# Patient Record
Sex: MALE | Race: WHITE | NOT HISPANIC OR LATINO
[De-identification: names, ages, dates, MRNs, and addresses within clinical notes are randomized per-mention and may not be internally consistent; named-entity substitution may affect disease eponyms.]

---

## 2017-01-27 ENCOUNTER — APPOINTMENT (OUTPATIENT)
Dept: SURGERY | Facility: CLINIC | Age: 37
End: 2017-01-27

## 2017-01-28 RX ORDER — ALPRAZOLAM 0.25 MG/1
0.25 TABLET ORAL
Qty: 30 | Refills: 0 | Status: ACTIVE | COMMUNITY
Start: 2016-12-13

## 2017-06-11 ENCOUNTER — EMERGENCY (EMERGENCY)
Facility: HOSPITAL | Age: 37
LOS: 1 days | Discharge: HOME | End: 2017-06-11
Admitting: EMERGENCY MEDICINE
Payer: MEDICAID

## 2017-06-11 PROCEDURE — 99284 EMERGENCY DEPT VISIT MOD MDM: CPT

## 2017-06-12 PROCEDURE — 96374 THER/PROPH/DIAG INJ IV PUSH: CPT

## 2017-06-12 PROCEDURE — 96361 HYDRATE IV INFUSION ADD-ON: CPT

## 2017-06-12 PROCEDURE — 99284 EMERGENCY DEPT VISIT MOD MDM: CPT | Mod: 25

## 2017-06-12 PROCEDURE — 80053 COMPREHEN METABOLIC PANEL: CPT

## 2017-06-12 PROCEDURE — 83690 ASSAY OF LIPASE: CPT

## 2017-06-12 PROCEDURE — 83735 ASSAY OF MAGNESIUM: CPT

## 2017-06-12 PROCEDURE — 85027 COMPLETE CBC AUTOMATED: CPT

## 2017-08-02 ENCOUNTER — APPOINTMENT (OUTPATIENT)
Dept: SURGERY | Facility: CLINIC | Age: 37
End: 2017-08-02
Payer: COMMERCIAL

## 2017-08-02 DIAGNOSIS — K40.90 UNILATERAL INGUINAL HERNIA, W/OUT OBSTRUCTION OR GANGRENE, NOT SPECIFIED AS RECURRENT: ICD-10-CM

## 2017-08-02 DIAGNOSIS — Z82.49 FAMILY HISTORY OF ISCHEMIC HEART DISEASE AND OTHER DISEASES OF THE CIRCULATORY SYSTEM: ICD-10-CM

## 2017-08-02 DIAGNOSIS — R10.31 RIGHT LOWER QUADRANT PAIN: ICD-10-CM

## 2017-08-02 PROCEDURE — 99213 OFFICE O/P EST LOW 20 MIN: CPT

## 2017-08-03 PROBLEM — R10.31 GROIN PAIN, RIGHT: Status: ACTIVE | Noted: 2017-01-28

## 2017-08-24 ENCOUNTER — EMERGENCY (EMERGENCY)
Facility: HOSPITAL | Age: 37
LOS: 1 days | Discharge: HOME | End: 2017-08-24
Admitting: EMERGENCY MEDICINE
Payer: MEDICAID

## 2017-08-24 PROCEDURE — 93010 ELECTROCARDIOGRAM REPORT: CPT

## 2017-08-24 PROCEDURE — 93005 ELECTROCARDIOGRAM TRACING: CPT

## 2017-08-24 PROCEDURE — 99283 EMERGENCY DEPT VISIT LOW MDM: CPT | Mod: 25

## 2017-08-24 PROCEDURE — 99284 EMERGENCY DEPT VISIT MOD MDM: CPT

## 2018-03-28 ENCOUNTER — OUTPATIENT (OUTPATIENT)
Dept: OUTPATIENT SERVICES | Facility: HOSPITAL | Age: 38
LOS: 1 days | End: 2018-03-28
Payer: MEDICAID

## 2018-03-28 ENCOUNTER — APPOINTMENT (OUTPATIENT)
Dept: CT IMAGING | Facility: HOSPITAL | Age: 38
End: 2018-03-28
Payer: COMMERCIAL

## 2018-03-28 DIAGNOSIS — Z00.8 ENCOUNTER FOR OTHER GENERAL EXAMINATION: ICD-10-CM

## 2018-03-28 PROCEDURE — 72193 CT PELVIS W/DYE: CPT

## 2018-03-28 PROCEDURE — 72193 CT PELVIS W/DYE: CPT | Mod: 26

## 2019-03-14 ENCOUNTER — TRANSCRIPTION ENCOUNTER (OUTPATIENT)
Age: 39
End: 2019-03-14

## 2019-12-20 ENCOUNTER — TRANSCRIPTION ENCOUNTER (OUTPATIENT)
Age: 39
End: 2019-12-20

## 2020-03-06 ENCOUNTER — TRANSCRIPTION ENCOUNTER (OUTPATIENT)
Age: 40
End: 2020-03-06

## 2021-11-08 ENCOUNTER — RESULT REVIEW (OUTPATIENT)
Age: 41
End: 2021-11-08

## 2022-07-27 ENCOUNTER — OUTPATIENT (OUTPATIENT)
Dept: OUTPATIENT SERVICES | Facility: HOSPITAL | Age: 42
LOS: 1 days | End: 2022-07-27
Payer: MEDICARE

## 2022-07-27 ENCOUNTER — APPOINTMENT (OUTPATIENT)
Dept: ULTRASOUND IMAGING | Facility: HOSPITAL | Age: 42
End: 2022-07-27

## 2022-07-27 DIAGNOSIS — Z00.8 ENCOUNTER FOR OTHER GENERAL EXAMINATION: ICD-10-CM

## 2022-07-27 PROCEDURE — 93975 VASCULAR STUDY: CPT

## 2022-07-27 PROCEDURE — 93975 VASCULAR STUDY: CPT | Mod: 26

## 2022-08-26 ENCOUNTER — APPOINTMENT (OUTPATIENT)
Dept: PEDIATRIC ALLERGY IMMUNOLOGY | Facility: CLINIC | Age: 42
End: 2022-08-26

## 2022-08-26 DIAGNOSIS — Z23 ENCOUNTER FOR IMMUNIZATION: ICD-10-CM

## 2022-08-26 PROCEDURE — 90611 SMALLPOX&MONKEYPOX VAC 0.5ML: CPT | Mod: NC

## 2022-08-26 PROCEDURE — 90471 IMMUNIZATION ADMIN: CPT

## 2022-09-23 ENCOUNTER — APPOINTMENT (OUTPATIENT)
Dept: PEDIATRIC ALLERGY IMMUNOLOGY | Facility: CLINIC | Age: 42
End: 2022-09-23

## 2023-10-28 ENCOUNTER — EMERGENCY (EMERGENCY)
Facility: HOSPITAL | Age: 43
LOS: 1 days | Discharge: HOME | End: 2023-10-28
Attending: STUDENT IN AN ORGANIZED HEALTH CARE EDUCATION/TRAINING PROGRAM | Admitting: STUDENT IN AN ORGANIZED HEALTH CARE EDUCATION/TRAINING PROGRAM
Payer: MEDICARE

## 2023-10-28 ENCOUNTER — TRANSCRIPTION ENCOUNTER (OUTPATIENT)
Age: 43
End: 2023-10-28

## 2023-10-28 VITALS
WEIGHT: 169.98 LBS | OXYGEN SATURATION: 98 % | HEIGHT: 71 IN | DIASTOLIC BLOOD PRESSURE: 87 MMHG | SYSTOLIC BLOOD PRESSURE: 123 MMHG | HEART RATE: 112 BPM | TEMPERATURE: 99 F

## 2023-10-28 VITALS
RESPIRATION RATE: 17 BRPM | HEART RATE: 87 BPM | SYSTOLIC BLOOD PRESSURE: 120 MMHG | DIASTOLIC BLOOD PRESSURE: 84 MMHG | OXYGEN SATURATION: 97 %

## 2023-10-28 LAB
ALBUMIN SERPL ELPH-MCNC: 3.5 G/DL — SIGNIFICANT CHANGE UP (ref 3.3–5)
ALBUMIN SERPL ELPH-MCNC: 3.5 G/DL — SIGNIFICANT CHANGE UP (ref 3.3–5)
ALP SERPL-CCNC: 67 U/L — SIGNIFICANT CHANGE UP (ref 40–120)
ALP SERPL-CCNC: 67 U/L — SIGNIFICANT CHANGE UP (ref 40–120)
ALT FLD-CCNC: 41 U/L — SIGNIFICANT CHANGE UP (ref 10–45)
ALT FLD-CCNC: 41 U/L — SIGNIFICANT CHANGE UP (ref 10–45)
ANION GAP SERPL CALC-SCNC: 6 MMOL/L — SIGNIFICANT CHANGE UP (ref 5–17)
ANION GAP SERPL CALC-SCNC: 6 MMOL/L — SIGNIFICANT CHANGE UP (ref 5–17)
AST SERPL-CCNC: 43 U/L — HIGH (ref 10–40)
AST SERPL-CCNC: 43 U/L — HIGH (ref 10–40)
BILIRUB SERPL-MCNC: 2.4 MG/DL — HIGH (ref 0.2–1.2)
BILIRUB SERPL-MCNC: 2.4 MG/DL — HIGH (ref 0.2–1.2)
BUN SERPL-MCNC: 8 MG/DL — SIGNIFICANT CHANGE UP (ref 7–23)
BUN SERPL-MCNC: 8 MG/DL — SIGNIFICANT CHANGE UP (ref 7–23)
CALCIUM SERPL-MCNC: 9.4 MG/DL — SIGNIFICANT CHANGE UP (ref 8.4–10.5)
CALCIUM SERPL-MCNC: 9.4 MG/DL — SIGNIFICANT CHANGE UP (ref 8.4–10.5)
CHLORIDE SERPL-SCNC: 99 MMOL/L — SIGNIFICANT CHANGE UP (ref 96–108)
CHLORIDE SERPL-SCNC: 99 MMOL/L — SIGNIFICANT CHANGE UP (ref 96–108)
CO2 SERPL-SCNC: 31 MMOL/L — SIGNIFICANT CHANGE UP (ref 22–31)
CO2 SERPL-SCNC: 31 MMOL/L — SIGNIFICANT CHANGE UP (ref 22–31)
CREAT SERPL-MCNC: 0.91 MG/DL — SIGNIFICANT CHANGE UP (ref 0.5–1.3)
CREAT SERPL-MCNC: 0.91 MG/DL — SIGNIFICANT CHANGE UP (ref 0.5–1.3)
EGFR: 108 ML/MIN/1.73M2 — SIGNIFICANT CHANGE UP
EGFR: 108 ML/MIN/1.73M2 — SIGNIFICANT CHANGE UP
GLUCOSE SERPL-MCNC: 106 MG/DL — HIGH (ref 70–99)
GLUCOSE SERPL-MCNC: 106 MG/DL — HIGH (ref 70–99)
HCT VFR BLD CALC: 49.3 % — SIGNIFICANT CHANGE UP (ref 39–50)
HCT VFR BLD CALC: 49.3 % — SIGNIFICANT CHANGE UP (ref 39–50)
HGB BLD-MCNC: 16.2 G/DL — SIGNIFICANT CHANGE UP (ref 13–17)
HGB BLD-MCNC: 16.2 G/DL — SIGNIFICANT CHANGE UP (ref 13–17)
LIDOCAIN IGE QN: 20 U/L — SIGNIFICANT CHANGE UP (ref 16–77)
LIDOCAIN IGE QN: 20 U/L — SIGNIFICANT CHANGE UP (ref 16–77)
MAGNESIUM SERPL-MCNC: 1.6 MG/DL — SIGNIFICANT CHANGE UP (ref 1.6–2.6)
MAGNESIUM SERPL-MCNC: 1.6 MG/DL — SIGNIFICANT CHANGE UP (ref 1.6–2.6)
MCHC RBC-ENTMCNC: 29 PG — SIGNIFICANT CHANGE UP (ref 27–34)
MCHC RBC-ENTMCNC: 29 PG — SIGNIFICANT CHANGE UP (ref 27–34)
MCHC RBC-ENTMCNC: 32.9 GM/DL — SIGNIFICANT CHANGE UP (ref 32–36)
MCHC RBC-ENTMCNC: 32.9 GM/DL — SIGNIFICANT CHANGE UP (ref 32–36)
MCV RBC AUTO: 88.2 FL — SIGNIFICANT CHANGE UP (ref 80–100)
MCV RBC AUTO: 88.2 FL — SIGNIFICANT CHANGE UP (ref 80–100)
NRBC # BLD: 0 /100 WBCS — SIGNIFICANT CHANGE UP (ref 0–0)
NRBC # BLD: 0 /100 WBCS — SIGNIFICANT CHANGE UP (ref 0–0)
PLATELET # BLD AUTO: 169 K/UL — SIGNIFICANT CHANGE UP (ref 150–400)
PLATELET # BLD AUTO: 169 K/UL — SIGNIFICANT CHANGE UP (ref 150–400)
POTASSIUM SERPL-MCNC: 4.9 MMOL/L — SIGNIFICANT CHANGE UP (ref 3.5–5.3)
POTASSIUM SERPL-MCNC: 4.9 MMOL/L — SIGNIFICANT CHANGE UP (ref 3.5–5.3)
POTASSIUM SERPL-SCNC: 4.9 MMOL/L — SIGNIFICANT CHANGE UP (ref 3.5–5.3)
POTASSIUM SERPL-SCNC: 4.9 MMOL/L — SIGNIFICANT CHANGE UP (ref 3.5–5.3)
PROT SERPL-MCNC: 7.3 G/DL — SIGNIFICANT CHANGE UP (ref 6–8.3)
PROT SERPL-MCNC: 7.3 G/DL — SIGNIFICANT CHANGE UP (ref 6–8.3)
RBC # BLD: 5.59 M/UL — SIGNIFICANT CHANGE UP (ref 4.2–5.8)
RBC # BLD: 5.59 M/UL — SIGNIFICANT CHANGE UP (ref 4.2–5.8)
RBC # FLD: 12.7 % — SIGNIFICANT CHANGE UP (ref 10.3–14.5)
RBC # FLD: 12.7 % — SIGNIFICANT CHANGE UP (ref 10.3–14.5)
SODIUM SERPL-SCNC: 136 MMOL/L — SIGNIFICANT CHANGE UP (ref 135–145)
SODIUM SERPL-SCNC: 136 MMOL/L — SIGNIFICANT CHANGE UP (ref 135–145)
WBC # BLD: 8 K/UL — SIGNIFICANT CHANGE UP (ref 3.8–10.5)
WBC # BLD: 8 K/UL — SIGNIFICANT CHANGE UP (ref 3.8–10.5)
WBC # FLD AUTO: 8 K/UL — SIGNIFICANT CHANGE UP (ref 3.8–10.5)
WBC # FLD AUTO: 8 K/UL — SIGNIFICANT CHANGE UP (ref 3.8–10.5)

## 2023-10-28 PROCEDURE — 36415 COLL VENOUS BLD VENIPUNCTURE: CPT

## 2023-10-28 PROCEDURE — 96375 TX/PRO/DX INJ NEW DRUG ADDON: CPT

## 2023-10-28 PROCEDURE — 74177 CT ABD & PELVIS W/CONTRAST: CPT | Mod: MA

## 2023-10-28 PROCEDURE — 83735 ASSAY OF MAGNESIUM: CPT

## 2023-10-28 PROCEDURE — 87077 CULTURE AEROBIC IDENTIFY: CPT

## 2023-10-28 PROCEDURE — 80053 COMPREHEN METABOLIC PANEL: CPT

## 2023-10-28 PROCEDURE — 87045 FECES CULTURE AEROBIC BACT: CPT

## 2023-10-28 PROCEDURE — 87046 STOOL CULTR AEROBIC BACT EA: CPT

## 2023-10-28 PROCEDURE — 74177 CT ABD & PELVIS W/CONTRAST: CPT | Mod: 26,MA

## 2023-10-28 PROCEDURE — 83690 ASSAY OF LIPASE: CPT

## 2023-10-28 PROCEDURE — 85027 COMPLETE CBC AUTOMATED: CPT

## 2023-10-28 PROCEDURE — 99284 EMERGENCY DEPT VISIT MOD MDM: CPT | Mod: 25

## 2023-10-28 PROCEDURE — 99285 EMERGENCY DEPT VISIT HI MDM: CPT

## 2023-10-28 PROCEDURE — 96374 THER/PROPH/DIAG INJ IV PUSH: CPT | Mod: XU

## 2023-10-28 RX ORDER — ONDANSETRON 8 MG/1
4 TABLET, FILM COATED ORAL ONCE
Refills: 0 | Status: COMPLETED | OUTPATIENT
Start: 2023-10-28 | End: 2023-10-28

## 2023-10-28 RX ORDER — SODIUM CHLORIDE 9 MG/ML
1000 INJECTION INTRAMUSCULAR; INTRAVENOUS; SUBCUTANEOUS ONCE
Refills: 0 | Status: COMPLETED | OUTPATIENT
Start: 2023-10-28 | End: 2023-10-28

## 2023-10-28 RX ORDER — ONDANSETRON 8 MG/1
1 TABLET, FILM COATED ORAL
Qty: 21 | Refills: 0
Start: 2023-10-28 | End: 2023-11-03

## 2023-10-28 RX ORDER — KETOROLAC TROMETHAMINE 30 MG/ML
15 SYRINGE (ML) INJECTION ONCE
Refills: 0 | Status: DISCONTINUED | OUTPATIENT
Start: 2023-10-28 | End: 2023-10-28

## 2023-10-28 RX ADMIN — Medication 15 MILLIGRAM(S): at 09:06

## 2023-10-28 RX ADMIN — SODIUM CHLORIDE 1000 MILLILITER(S): 9 INJECTION INTRAMUSCULAR; INTRAVENOUS; SUBCUTANEOUS at 11:44

## 2023-10-28 RX ADMIN — SODIUM CHLORIDE 1000 MILLILITER(S): 9 INJECTION INTRAMUSCULAR; INTRAVENOUS; SUBCUTANEOUS at 09:06

## 2023-10-28 RX ADMIN — Medication 15 MILLIGRAM(S): at 09:36

## 2023-10-28 RX ADMIN — Medication 1 TABLET(S): at 11:45

## 2023-10-28 RX ADMIN — ONDANSETRON 4 MILLIGRAM(S): 8 TABLET, FILM COATED ORAL at 09:12

## 2023-10-28 NOTE — ED ADULT NURSE NOTE - OBJECTIVE STATEMENT
Pt presents to ED with c/o diarrhea.  Reports diarrhea for several days, accompanied by intermittent LLQ pain described as cramping sensation.  Reports fevers, chills, nausea.  Denies any urinary complaints.  Now presents to ED with c/o blood in loose stool starting yesterday.

## 2023-10-28 NOTE — ED PROVIDER NOTE - NSFOLLOWUPINSTRUCTIONS_ED_ALL_ED_FT
COLITIS  Colitis is inflammation of the colon. Colitis may last a short time (be acute), or it may last a long time (become chronic).    What are the causes?  This condition may be caused by:  •Viruses.  •Bacteria.  •Reaction to medicine.  •Certain autoimmune diseases such as Crohn's disease or ulcerative colitis.    What are the signs or symptoms?  Symptoms of this condition include:  •Watery diarrhea.  •Passing bloody or tarry stool.  •Pain.  •Fever.  •Vomiting.  •Tiredness (fatigue).  •Weight loss.  •Bloating.  •Abdominal pain.  •Having fewer bowel movements than usual.  •A strong and sudden urge to have a bowel movement.  •Feeling like the bowel is not empty after a bowel movement.    HOW IS THIS CONDITION DIAGNOSED AND TREATED?  This condition is diagnosed with a stool test or a blood test.  You may also have other tests, such as:  •CT scan.  •Colonoscopy.  •Endoscopy.  •Biopsy.    How is this treated?  Treatment for this condition depends on the cause. The condition may be treated by:  •Resting the bowel. This involves not eating or drinking for a period of time.  •Fluids that are given through an IV.  •Medicine for pain and diarrhea.  •Antibiotic medicines.  •Cortisone medicines.  •Surgery.    FOLLOW THESE INSTRUCTIONS AT HOME  Eating and drinking   •Follow instructions from your health care provider about eating or drinking restrictions.  •Drink enough fluid to keep your urine pale yellow.  •Work with a dietitian to determine which foods cause your condition to flare up.  •Avoid foods that cause flare-ups.  •Eat a well-balanced diet.    General instructions   •If you were prescribed an antibiotic medicine, take it as told by your health care provider. Do not stop taking the antibiotic even if you start to feel better.  •Take over-the-counter and prescription medicines only as told by your health care provider.  •Keep all follow-up visits as told by your health care provider. This is important.    Contact a health care provider if:  •Your symptoms do not go away.  •You develop new symptoms.    Get help right away if you:  •Have a fever that does not go away with treatment.  •Develop chills  •Have extreme weakness, fainting, or dehydration.  •Have repeated vomiting.  •Develop severe pain in your abdomen.  •Pass bloody or tarry stool.    Summary  •Colitis is inflammation of the colon. Colitis may last a short time (be acute), or it may last a long time (become chronic).  •Treatment for this condition depends on the cause and may include resting the bowel, taking medicines, or having surgery.  •If you were prescribed an antibiotic medicine, take it as told by your health care provider. Do not stop taking the antibiotic even if you start to feel better.  •Get help right away if you develop severe pain in your abdomen.  •Keep all follow-up visits as told by your health care provider. This is important.    Advance activity as tolerated.  Continue all previously prescribed medications as directed unless otherwise instructed.  Follow up with your primary care physician in 48-72 hours- bring copies of your results.  Return to the ER for worsening or persistent symptoms, and/or ANY NEW OR CONCERNING SYMPTOMS. If you have issues obtaining follow up, please call: [phone] to obtain a doctor or specialist who takes your insurance in your area.  You may call [phone] to make an appointment with the internal medicine clinic.

## 2023-10-28 NOTE — ED PROVIDER NOTE - PATIENT PORTAL LINK FT
You can access the FollowMyHealth Patient Portal offered by [facility] by registering at the following website: [email or web address]. By joining [facility]’s FollowMyHealth portal, you will also be able to view your health information using other applications (apps) compatible with our system.

## 2023-10-28 NOTE — ED ADULT TRIAGE NOTE - CHIEF COMPLAINT QUOTE
C/o diarrhea, fever/chills, body aches since Thursday. States he is now having blood in stool. Covid/flu negative yesterday at PCP office yesterday.

## 2023-10-28 NOTE — ED PROVIDER NOTE - PHYSICAL EXAMINATION
VITAL SIGNS: I have reviewed nursing notes and confirm.   GEN: Well-developed; well-nourished; in no acute distress. Speaking full sentences.  SKIN: Warm, pink, no rash, no diaphoresis, no cyanosis, well perfused.   HEAD: Normocephalic; atraumatic. No scalp lacerations, no abrasions.  NECK: Supple; non tender.   EYES: Pupils 3mm equal, round, reactive to light and accomodation, conjunctiva and sclera clear. Extra-ocular movements intact bilaterally.  ENT: No nasal discharge; airway clear. Trachea is midline. Normal dentition.  CV: RRR. S1, S2 normal; no murmurs, gallops, or rubs. Capillary refill < 2 seconds throughout. Distal pulses intact 2+ throughout.  RESP: CTA bilaterally. No wheezes, rales, or rhonchi.   ABD: Normal bowel sounds, soft, non-distended, (+) mild diffuse ttp, no rebound, no guarding, no rigidity, no hepatosplenomegaly.  MSK: Normal range of motion and movement of all 4 extremities. No apparent joint or muscular pain throughout.    BACK: No thoracolumbar midline or paravertebral tenderness. No step-offs or obvious deformities.  NEURO: Alert & oriented x 3, Grossly unremarkable. Sensory and motor intact throughout. No focal deficits. Gait: Fluid. Normal speech and coordination.

## 2023-10-28 NOTE — ED PROVIDER NOTE - INTERNATIONAL TRAVEL
Problem: At Risk for Falls  Goal: # Patient does not fall  Outcome: Outcome Met, Continue evaluating goal progress toward completion  Goal: # Takes action to control fall-related risks  Outcome: Outcome Met, Continue evaluating goal progress toward completion  Goal: # Verbalizes understanding of fall risk/precautions  Description: Document education using the patient education activity  Outcome: Outcome Met, Continue evaluating goal progress toward completion     Problem: At Risk for Injury Due to Fall  Goal: # Patient does not fall  Outcome: Outcome Met, Continue evaluating goal progress toward completion     Problem: Artificial Airway Management  Goal: # Maintains effective artificial airway  Outcome: Outcome Met, Continue evaluating goal progress toward completion  Goal: # Maintains skin integrity around the airway  Outcome: Outcome Met, Continue evaluating goal progress toward completion     Problem: Pressure Injury, Risk for  Goal: # Skin remains intact  Outcome: Outcome Met, Continue evaluating goal progress toward completion  Goal: No new pressure injury (PI) development  Outcome: Outcome Met, Continue evaluating goal progress toward completion  Goal: # Verbalizes understanding of PI risk factors and prevention strategies  Description: Document education using the patient education activity.   Outcome: Outcome Met, Continue evaluating goal progress toward completion  Goal: Comfort optimized with pressure injury prevention strategies guided by patient/family preference. (Hospice)  Outcome: Outcome Met, Continue evaluating goal progress toward completion     Problem: Activity Intolerance  Goal: # Functional status is maintained or returned to baseline  Outcome: Outcome Met, Continue evaluating goal progress toward completion  Goal: # Tolerates activity for d/c setting with no clinical problems  Outcome: Outcome Met, Continue evaluating goal progress toward completion     Problem: Delirium, Risk for  Goal: #  No symptoms of delirium  Description: Evaluate delirium symptoms under active problem when present  Outcome: Outcome Met, Continue evaluating goal progress toward completion  Goal: # Verbalizes understanding of delirium preventive strategies  Description: Document on Patient Education Activity   Outcome: Outcome Met, Continue evaluating goal progress toward completion     Problem: Impaired Physical Mobility  Goal: # Bed mobility, ambulation, and ADLs are maintained or returned to baseline during hospitalization  Outcome: Outcome Met, Continue evaluating goal progress toward completion     Problem: VTE, Risk for  Goal: # No s/s of VTE  Outcome: Outcome Met, Continue evaluating goal progress toward completion  Goal: # Verbalizes understanding of VTE risk factors and prevention  Description: Document education using the patient education activity.   Outcome: Outcome Met, Continue evaluating goal progress toward completion  Goal: Demonstrates ability to administer injectable anticoagulants if ordered for d/c  Description: Document education using the patient education activity.  Outcome: Outcome Met, Continue evaluating goal progress toward completion     Problem: Potential for injury, Restraints  Goal: # Remains free from injury  Outcome: Outcome Met, Continue evaluating goal progress toward completion  Goal: Verbalizes criteria for restraint discontinuation  Description: Document on Patient Education Activity  Outcome: Outcome Met, Continue evaluating goal progress toward completion      No

## 2023-10-28 NOTE — ED ADULT NURSE NOTE - NSFALLUNIVINTERV_ED_ALL_ED
Bed/Stretcher in lowest position, wheels locked, appropriate side rails in place/Call bell, personal items and telephone in reach/Instruct patient to call for assistance before getting out of bed/chair/stretcher/Non-slip footwear applied when patient is off stretcher/[place] to call system/Physically safe environment - no spills, clutter or unnecessary equipment/Purposeful proactive rounding/Room/bathroom lighting operational, light cord in reach

## 2023-10-28 NOTE — ED PROVIDER NOTE - OBJECTIVE STATEMENT
43m no pmhx presenting with diarrhea x 1 day. Describes onset of wattery diarrhea this morning, multiple times a/w fever. Mild abdominal diffuse cramping pain, nonradiating. Denies any chest pain,  shortness of breath, nausea/vomiting, headaches,   weakness, syncope, hematuria, dysuria, urinary symptoms, subjective neurological deficits, surgical hx, trauma, falls, rashes, sick contacts, recent abx use, recent hospitalizations, recent travel hx.

## 2023-10-28 NOTE — ED PROVIDER NOTE - CLINICAL SUMMARY MEDICAL DECISION MAKING FREE TEXT BOX
This patient w/ nausea/vomiting and diarrhea is likely secondary to benign infectious cause vs gastroenteritis. Considered the differential diagnosis but LOW risk for SBO (normal bowel movements, passing flatus, XXX no abdominal surgeries), no signs of DKA on labs. Patient's BMP with normal electrolytes and no signs of dehydration causing prerenal CHELSEY. Low suspicion for gastric or esophageal dysmotility as a cause. XXX Patient w/ no chest pain, unremarkable EKG, thus LOW suspicion for ACS. Based on the history, exam, and work up, there is a low suspicion for pancreatitis, appendicitis, biliary pathology, C. Diff or other emergent problems.  PLAN:  - IVF, CBC/CMP, Lipase, CT colitis  - Pain control PRN  - augmentin This patient w/ nausea/vomiting and diarrhea is likely secondary to benign infectious cause vs gastroenteritis. Considered the differential diagnosis but LOW risk for SBO (normal bowel movements, passing flatus, no abdominal surgeries), no signs of DKA on labs. Patient's BMP with normal electrolytes and no signs of dehydration causing prerenal CHELSEY. Low suspicion for gastric or esophageal dysmotility as a cause. XXX Patient w/ no chest pain, unremarkable EKG, thus LOW suspicion for ACS. Based on the history, exam, and work up, there is a low suspicion for pancreatitis, appendicitis, biliary pathology, C. Diff or other emergent problems.  PLAN:  - IVF, CBC/CMP, Lipase, CT showing colitis  - Mild dehydration likely.  - Pain control PRN  - augmentin BID x 14 d   - Loperamide OTC for diarrhea control   - Follow up with PMD This patient w/ nausea/vomiting and diarrhea is likely secondary to benign infectious cause vs gastroenteritis. Considered the differential diagnosis but LOW risk for SBO (normal bowel movements, passing flatus, no abdominal surgeries), no signs of DKA on labs. Patient's BMP with normal electrolytes and no signs of dehydration causing prerenal CHELSEY. Low suspicion for gastric or esophageal dysmotility as a cause.  Patient w/ no chest pain,  thus LOW suspicion for ACS. Based on the history, exam, and work up, there is a low suspicion for pancreatitis, appendicitis, biliary pathology, C. Diff or other emergent problems.  PLAN:  - IVF, CBC/CMP, Lipase, CT showing colitis  - Mild dehydration likely.  - Pain control PRN  - augmentin BID x 14 d   - Loperamide OTC for diarrhea control   - Follow up with PMD

## 2023-10-30 LAB
CULTURE RESULTS: SIGNIFICANT CHANGE UP
CULTURE RESULTS: SIGNIFICANT CHANGE UP
SPECIMEN SOURCE: SIGNIFICANT CHANGE UP
SPECIMEN SOURCE: SIGNIFICANT CHANGE UP

## 2023-11-17 ENCOUNTER — APPOINTMENT (OUTPATIENT)
Dept: SURGERY | Facility: CLINIC | Age: 43
End: 2023-11-17

## 2023-12-27 ENCOUNTER — APPOINTMENT (OUTPATIENT)
Dept: MRI IMAGING | Facility: CLINIC | Age: 43
End: 2023-12-27

## 2023-12-27 ENCOUNTER — OUTPATIENT (OUTPATIENT)
Dept: OUTPATIENT SERVICES | Facility: HOSPITAL | Age: 43
LOS: 1 days | End: 2023-12-27
Payer: MEDICARE

## 2023-12-27 ENCOUNTER — APPOINTMENT (OUTPATIENT)
Dept: MRI IMAGING | Facility: CLINIC | Age: 43
End: 2023-12-27
Payer: MEDICARE

## 2023-12-27 DIAGNOSIS — R19.7 DIARRHEA, UNSPECIFIED: ICD-10-CM

## 2023-12-27 PROCEDURE — A9585: CPT

## 2023-12-27 PROCEDURE — 74183 MRI ABD W/O CNTR FLWD CNTR: CPT

## 2023-12-27 PROCEDURE — 74183 MRI ABD W/O CNTR FLWD CNTR: CPT | Mod: 26

## 2023-12-27 PROCEDURE — 72197 MRI PELVIS W/O & W/DYE: CPT | Mod: 26

## 2023-12-27 PROCEDURE — 72197 MRI PELVIS W/O & W/DYE: CPT

## 2024-01-09 ENCOUNTER — APPOINTMENT (OUTPATIENT)
Dept: SURGERY | Facility: CLINIC | Age: 44
End: 2024-01-09
Payer: MEDICARE

## 2024-01-09 ENCOUNTER — NON-APPOINTMENT (OUTPATIENT)
Age: 44
End: 2024-01-09

## 2024-01-09 VITALS
SYSTOLIC BLOOD PRESSURE: 137 MMHG | OXYGEN SATURATION: 98 % | TEMPERATURE: 97.3 F | RESPIRATION RATE: 17 BRPM | WEIGHT: 162 LBS | DIASTOLIC BLOOD PRESSURE: 87 MMHG | HEIGHT: 71 IN | HEART RATE: 60 BPM | BODY MASS INDEX: 22.68 KG/M2

## 2024-01-09 DIAGNOSIS — Z83.79 FAMILY HISTORY OF OTHER DISEASES OF THE DIGESTIVE SYSTEM: ICD-10-CM

## 2024-01-09 DIAGNOSIS — Q40.0 CONGENITAL HYPERTROPHIC PYLORIC STENOSIS: ICD-10-CM

## 2024-01-09 PROCEDURE — 99203 OFFICE O/P NEW LOW 30 MIN: CPT

## 2024-01-09 RX ORDER — PANTOPRAZOLE 40 MG/1
40 TABLET, DELAYED RELEASE ORAL DAILY
Qty: 30 | Refills: 0 | Status: DISCONTINUED | COMMUNITY
Start: 2016-08-16 | End: 2024-01-09

## 2024-01-09 RX ORDER — AMOXICILLIN 875 MG/1
875 TABLET, FILM COATED ORAL
Qty: 14 | Refills: 0 | Status: DISCONTINUED | COMMUNITY
Start: 2016-12-12 | End: 2024-01-09

## 2024-01-09 RX ORDER — AZITHROMYCIN 250 MG/1
250 TABLET, FILM COATED ORAL
Qty: 6 | Refills: 0 | Status: DISCONTINUED | COMMUNITY
Start: 2016-12-13 | End: 2024-01-09

## 2024-01-09 RX ORDER — PREDNISONE 20 MG/1
20 TABLET ORAL DAILY
Qty: 10 | Refills: 0 | Status: DISCONTINUED | COMMUNITY
Start: 2016-12-12 | End: 2024-01-09

## 2024-01-09 RX ORDER — VALACYCLOVIR 500 MG/1
500 TABLET, FILM COATED ORAL
Qty: 30 | Refills: 0 | Status: DISCONTINUED | COMMUNITY
Start: 2016-08-16 | End: 2024-01-09

## 2024-01-09 RX ORDER — DEXLANSOPRAZOLE 60 MG/1
CAPSULE, DELAYED RELEASE ORAL
Refills: 0 | Status: ACTIVE | COMMUNITY

## 2024-01-09 RX ORDER — ALBUTEROL SULFATE 90 UG/1
108 (90 BASE) AEROSOL, METERED RESPIRATORY (INHALATION)
Qty: 18 | Refills: 0 | Status: DISCONTINUED | COMMUNITY
Start: 2016-12-12 | End: 2024-01-09

## 2024-04-30 ENCOUNTER — APPOINTMENT (OUTPATIENT)
Dept: SURGERY | Facility: CLINIC | Age: 44
End: 2024-04-30
Payer: MEDICARE

## 2024-04-30 VITALS
SYSTOLIC BLOOD PRESSURE: 119 MMHG | HEIGHT: 71 IN | HEART RATE: 50 BPM | RESPIRATION RATE: 17 BRPM | WEIGHT: 160 LBS | TEMPERATURE: 98.7 F | BODY MASS INDEX: 22.4 KG/M2 | OXYGEN SATURATION: 98 % | DIASTOLIC BLOOD PRESSURE: 77 MMHG

## 2024-04-30 DIAGNOSIS — K60.3 ANAL FISTULA: ICD-10-CM

## 2024-04-30 PROCEDURE — 99213 OFFICE O/P EST LOW 20 MIN: CPT

## 2024-04-30 NOTE — CONSULT LETTER
[Dear  ___] : Dear  [unfilled], [Consult Letter:] : I had the pleasure of evaluating your patient, [unfilled]. [Please see my note below.] : Please see my note below. [Consult Closing:] : Thank you very much for allowing me to participate in the care of this patient.  If you have any questions, please do not hesitate to contact me. [Sincerely,] : Sincerely, [FreeTextEntry3] : Jw Martinez M.D., F.A.C.S, F.A.S.C.R.S

## 2024-04-30 NOTE — HISTORY OF PRESENT ILLNESS
[FreeTextEntry1] : Malachi is a 42 y/o male here for follow up visit, discuss sx.   [facility] ED 10/28/23 for diarrhea, fever/chills, blood in loose stool - was diagnosed with bacterial colitis.  Colonoscopy by Dr. Flowers 05/29/19 (Diarrhea, unspecified) - Internal hemorrhoids. Benign neoplasm of sigmoid colon. Paucity of rectgal vasculature.  CT A/P 10/28/23 (Lower abdominal pain and bloody stool. Vomiting) - Segments of mild colonic wall thickening suggestive of colitis. No bowel obstruction or free air. Normal appendix.  MRI A+P on 12/27/23 - No evidence of large or small bowel thickening or disease. 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 There is no abscess identified.  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 Ordinarily I would recommend an anal fistulotomy however there was concern that he may have Crohn's disease. He had IBD serologies that are reportedly positive. He had a recent episode of "colitis". I will speak with his gastroenterologist regarding possible additional endoscopic evaluation. If he is felt to have Crohn's disease I would likely recommend a seton rather than fistulotomy.  Today pt reports occasional rectal discomfort.  BM's every 2-3 days, irregular- constipation/Diarrhea (IBS), Takes colace every other day. No bleeding or mucus discharge.   Denies episodes of incontinence and denies feeling swollen or prolapsed tissue. Denies fevers or chills. Not taking any anticoagulants.

## 2024-04-30 NOTE — PHYSICAL EXAM
[Wheezing] : no wheezing was heard [Normal Rate and Rhythm] : normal rate and rhythm [No Rash or Lesion] : No rash or lesion [Alert] : alert [Calm] : calm [de-identified] : Perianal inspection reveals a short chronic left posterior anal fistula.  There is no abscess anal rectal tone is normal [de-identified] : nad [de-identified] : nl

## 2024-04-30 NOTE — ASSESSMENT
[FreeTextEntry1] : In summary the patient has a chronic left posterior anal fistula.  The fistula is short and appears intersphincteric on exam however MRI suggest that it may be transsphincteric..  I explained to the patient that either way this will require surgery.  I recommended rectal examination under anesthesia with likely anal fistulotomy.  I explained the risks benefits and alternatives of surgery including the risks of bleeding infection fecal incontinence and possible need for multiple surgeries.  I explained that if his external sphincter is involved I will likely employ a cutting seton.  He had a recent colonoscopy by  and he states that this was normal without evidence of Crohn's disease.

## 2024-06-10 ENCOUNTER — NON-APPOINTMENT (OUTPATIENT)
Age: 44
End: 2024-06-10

## 2024-12-03 ENCOUNTER — EMERGENCY (EMERGENCY)
Facility: HOSPITAL | Age: 44
LOS: 1 days | Discharge: HOME | End: 2024-12-03
Attending: EMERGENCY MEDICINE | Admitting: EMERGENCY MEDICINE
Payer: MEDICARE

## 2024-12-03 VITALS
DIASTOLIC BLOOD PRESSURE: 94 MMHG | WEIGHT: 139.99 LBS | HEART RATE: 89 BPM | OXYGEN SATURATION: 99 % | RESPIRATION RATE: 18 BRPM | TEMPERATURE: 97 F | HEIGHT: 67 IN | SYSTOLIC BLOOD PRESSURE: 142 MMHG

## 2024-12-03 PROCEDURE — 99284 EMERGENCY DEPT VISIT MOD MDM: CPT

## 2024-12-03 PROCEDURE — 70450 CT HEAD/BRAIN W/O DYE: CPT | Mod: 26,MC

## 2024-12-03 PROCEDURE — 70450 CT HEAD/BRAIN W/O DYE: CPT | Mod: MC

## 2024-12-03 PROCEDURE — 99284 EMERGENCY DEPT VISIT MOD MDM: CPT | Mod: 25

## 2024-12-03 NOTE — ED ADULT NURSE NOTE - HIV OFFER
[facility]   MRN-[id number]         CC: Patient is a 88y old  Male who presents with a chief complaint of right inguinal discomfort (08 May 2019 12:05)    HPI:  Mr. Cha is an 89 yo M, poor historian, with history of CAD s/p 3 stents (2012, 2009, 2000) still on ASA/Plavix, ETOH abuse, b/l inguinal hernias s/p laparoscopic robotic-assisted repair of bilateral inguinal hernias on 3/11/2019 (notably, 4.5L of ascites were also drained in the beginning of the procedure and sent for cytopathology) presenting with right inguinal hernia discomfort. Patient denies any associated fevers, chills, nausea, or emesis. Patient cannot remember last time he had bowel movement or passed flatus, but per nursing staff, patient a large bowel movement in his stretcher in the ED. Patient reports having a colonoscopy at age 72s which was reportedly normal. He denies ever having an endoscopy.     In the ED, patient is afebrile, HR 90s, , RR20, saturation 95%. WBC 20.8, hgb 10.3, INR 1.82, PT 21, PTT 24, Na 130. CT ab/pelvis with IV contrast shows 1) bilateral segmental pulmonary emboli, 2) left lung empyema, 3) large pneumoperitoneum, 4) 10 x 5 cm abscess in LLQ (possible source of pneumoperitoneum), 5) 6 x 4 cm fluid collection in right groin, and 6) DVT in bilateral common femoral vein, central venous pelvic thrombus in the left common iliac and internal iliac veins.    PMH/PSx: CAD s/p 3 stents (2012, 2009, 2000) still on ASA/Plavix, ETOH abuse, b/l inguinal hernias s/p laparoscopic robotic-assisted repair of bilateral inguinal hernias on 3/11/2019 w/ drainage of 4.5 L of ascites,   Allergies: NKDA  Meds: see med rec  FH: patient denies FH of cancers or IBD  SH: patient reports significant alcohol use in youth but was unable to quantify the number of daily drinks. Patient denies smoking history or drug use (05 Apr 2019 19:49)    SUBJECTIVE:  ROS:  UNABLE TO OBTAIN  due to:    DYSPNEA (Y/N):	  N/V (Y/N):	  SECRETIONS (Y/N):	  AGITATION (Y/N):  PAIN(Y/N):        -Provocation/Palliation:     -Quality/Quantity:     -Radiating:     -Severity:     -Timing/Frequency:     -Impact on ADLs:  GENERAL:    HEENT:      	  NECK:           CVS:           	  RESP:        	  GI:             	  :            	  MUSC:       	  NEURO:     	  PSYCH:        PHYSICAL EXAM:  T(C): 36.8 (05-08-19 @ 13:22), Max: 37.3 (05-08-19 @ 01:00)  T(F): 98.2 (05-08-19 @ 13:22), Max: 99.1 (05-08-19 @ 01:00)  HR: 118 (05-08-19 @ 15:19) (104 - 124)  BP: 102/50 (05-08-19 @ 13:09) (84/55 - 117/54)  RR: 17 (05-08-19 @ 13:09) (12 - 26)  SpO2: 99% (05-08-19 @ 15:19) (97% - 100%)  Wt(kg): --  GENERAL:    HEENT:      	  NECK:           CVS:           	  RESP:        	  GI:             	  :            	  MUSC:       	  NEURO:     	  PSYCH:          SKIN:         	   LYMPH:         ALLERGIES:  No Known Allergies      OPIATE NAÏVE (Y/N):  -iStop reviewed (Y/N): Y    MEDICATIONS: reviewed  MEDICATIONS  (STANDING):  aspirin  chewable 81 milliGRAM(s) Oral daily  chlorhexidine 0.12% Liquid 15 milliLiter(s) Oral Mucosa two times a day  chlorhexidine 2% Cloths 1 Application(s) Topical <User Schedule>  dextrose 5%. 1000 milliLiter(s) (50 mL/Hr) IV Continuous <Continuous>  dextrose 50% Injectable 12.5 Gram(s) IV Push once  dextrose 50% Injectable 25 Gram(s) IV Push once  dextrose 50% Injectable 25 Gram(s) IV Push once  furosemide    Tablet 60 milliGRAM(s) Oral every 12 hours  heparin  flush 100 Units/mL Injectable 100 Unit(s) IV Push every other day  heparin  Infusion 850 Unit(s)/Hr (8.5 mL/Hr) IV Continuous <Continuous>  metoclopramide Injectable 10 milliGRAM(s) IV Push every 12 hours  midodrine 15 milliGRAM(s) Oral every 8 hours  pantoprazole   Suspension 40 milliGRAM(s) Enteral Tube daily  sodium bicarbonate 650 milliGRAM(s) Oral every 6 hours    MEDICATIONS  (PRN):  acetaminophen    Suspension .. 650 milliGRAM(s) Enteral Tube every 6 hours PRN Temp greater or equal to 38C (100.4F)  dextrose 40% Gel 15 Gram(s) Oral once PRN Blood Glucose LESS THAN 70 milliGRAM(s)/deciliter  fentaNYL    Injectable 25 MICROGram(s) IV Push every 3 hours PRN Moderate Pain (4 - 6)  fentaNYL    Injectable 50 MICROGram(s) IV Push every 3 hours PRN Severe Pain (7 - 10)  glucagon  Injectable 1 milliGRAM(s) IntraMuscular once PRN Glucose LESS THAN 70 milligrams/deciliter  ondansetron Injectable 4 milliGRAM(s) IV Push every 6 hours PRN Nausea      LABS: reviewed            PTT - ( 08 May 2019 07:50 )  PTT:49.6 sec    IMAGING: reviewed    ADVANCE DIRECTIVES:    DNR    MOLST    Decision maker:  Legal surrogate:    PSYCHOSOCIAL-SPIRITUAL ASSESSMENT:  Reviewed  Care plan unchanged	    GOALS OF CARE DISCUSSION:  Palliative care info/counseling provided	          See previous Palliative Medicine Note  Documentation of GOC:     AGENCY CHOICE DISCUSSED:     Other:LTACH          REFERRALS:	   Palliative Med   Unit SW/Case Mgmt    Nutrition  PT/OT    [ ]CRITICAL CARE TIME PROVIDED TO UNSTABLE PT W/ ORGAN FAILURE            [x]> 50% OF THE TIME SPENT IN COUNSELING AND COORDINATING CARE     [ ]PROLONGED SERVICE       FACE TO FACE: [x]PT     [ ]PT & FAMILY    Start:               End:  	       Minutes: [facility]   MRN-[id number]         CC: Patient is a 88y old  Male who presents with a chief complaint of right inguinal discomfort (08 May 2019 12:05)    HPI:  Mr. Cha is an 89 yo M, poor historian, with history of CAD s/p 3 stents (2012, 2009, 2000) still on ASA/Plavix, ETOH abuse, b/l inguinal hernias s/p laparoscopic robotic-assisted repair of bilateral inguinal hernias on 3/11/2019 (notably, 4.5L of ascites were also drained in the beginning of the procedure and sent for cytopathology) presenting with right inguinal hernia discomfort. Patient denies any associated fevers, chills, nausea, or emesis. Patient cannot remember last time he had bowel movement or passed flatus, but per nursing staff, patient a large bowel movement in his stretcher in the ED. Patient reports having a colonoscopy at age 72s which was reportedly normal. He denies ever having an endoscopy.     In the ED, patient is afebrile, HR 90s, , RR20, saturation 95%. WBC 20.8, hgb 10.3, INR 1.82, PT 21, PTT 24, Na 130. CT ab/pelvis with IV contrast shows 1) bilateral segmental pulmonary emboli, 2) left lung empyema, 3) large pneumoperitoneum, 4) 10 x 5 cm abscess in LLQ (possible source of pneumoperitoneum), 5) 6 x 4 cm fluid collection in right groin, and 6) DVT in bilateral common femoral vein, central venous pelvic thrombus in the left common iliac and internal iliac veins.    PMH/PSx: CAD s/p 3 stents (2012, 2009, 2000) still on ASA/Plavix, ETOH abuse, b/l inguinal hernias s/p laparoscopic robotic-assisted repair of bilateral inguinal hernias on 3/11/2019 w/ drainage of 4.5 L of ascites,   Allergies: NKDA  Meds: see med rec  FH: patient denies FH of cancers or IBD  SH: patient reports significant alcohol use in youth but was unable to quantify the number of daily drinks. Patient denies smoking history or drug use (05 Apr 2019 19:49)    SUBJECTIVE: Pt transferred to Step Down, d/c planning for LTACH, awaiting response from HCP/brother per , already cleared by primary team for d/c  ROS:  UNABLE TO OBTAIN  due to: encephalopathic    DYSPNEA (Y/N):	  N/V (Y/N):	  SECRETIONS (Y/N):	  AGITATION (Y/N):  PAIN(Y/N):        -Provocation/Palliation:     -Quality/Quantity:     -Radiating:     -Severity:     -Timing/Frequency:     -Impact on ADLs:    PHYSICAL EXAM:  T(C): 36.8 (05-08-19 @ 13:22), Max: 37.3 (05-08-19 @ 01:00)  T(F): 98.2 (05-08-19 @ 13:22), Max: 99.1 (05-08-19 @ 01:00)  HR: 118 (05-08-19 @ 15:19) (104 - 124)  BP: 102/50 (05-08-19 @ 13:09) (84/55 - 117/54)  RR: 17 (05-08-19 @ 13:09) (12 - 26)  SpO2: 99% (05-08-19 @ 15:19) (97% - 100%)    GENERAL: Sick looking gentleman on vent    HEENT: bilateral temporal wasting, anicteric, eyes closed upon entering, but opens eyes to mild verbal stimuli      CVS: ST on ECG          	  RESP: trach to CMV on vent       	  GI: peg is clamped             	  : primafit            	  MUSC: LE 3-4+ edema      	  NEURO: awake with eyes opening spontaneously, but not tracking or appear to make any attempt to speak, not following any commands     	  PSYCH: flat           ALLERGIES:  No Known Allergies    OPIATE NAÏVE (Y/N): y  -iStop reviewed (Y/N): Y, on initial consult    MEDICATIONS: reviewed  MEDICATIONS  (STANDING):  aspirin  chewable 81 milliGRAM(s) Oral daily  chlorhexidine 0.12% Liquid 15 milliLiter(s) Oral Mucosa two times a day  chlorhexidine 2% Cloths 1 Application(s) Topical <User Schedule>  dextrose 5%. 1000 milliLiter(s) (50 mL/Hr) IV Continuous <Continuous>  dextrose 50% Injectable 12.5 Gram(s) IV Push once  dextrose 50% Injectable 25 Gram(s) IV Push once  dextrose 50% Injectable 25 Gram(s) IV Push once  furosemide    Tablet 60 milliGRAM(s) Oral every 12 hours  heparin  flush 100 Units/mL Injectable 100 Unit(s) IV Push every other day  heparin  Infusion 850 Unit(s)/Hr (8.5 mL/Hr) IV Continuous <Continuous>  metoclopramide Injectable 10 milliGRAM(s) IV Push every 12 hours  midodrine 15 milliGRAM(s) Oral every 8 hours  pantoprazole   Suspension 40 milliGRAM(s) Enteral Tube daily  sodium bicarbonate 650 milliGRAM(s) Oral every 6 hours    MEDICATIONS  (PRN):  acetaminophen    Suspension .. 650 milliGRAM(s) Enteral Tube every 6 hours PRN Temp greater or equal to 38C (100.4F)  dextrose 40% Gel 15 Gram(s) Oral once PRN Blood Glucose LESS THAN 70 milliGRAM(s)/deciliter  fentaNYL    Injectable 25 MICROGram(s) IV Push every 3 hours PRN Moderate Pain (4 - 6)  fentaNYL    Injectable 50 MICROGram(s) IV Push every 3 hours PRN Severe Pain (7 - 10)  glucagon  Injectable 1 milliGRAM(s) IntraMuscular once PRN Glucose LESS THAN 70 milligrams/deciliter  ondansetron Injectable 4 milliGRAM(s) IV Push every 6 hours PRN Nausea    LABS: reviewed    PTT - ( 08 May 2019 07:50 )  PTT:49.6 sec    IMAGING: reviewed    ADVANCE DIRECTIVES:    DNR    MOLST    Decision maker: Vikash Cha/elidaer [phone]  Legal surrogate:    PSYCHOSOCIAL-SPIRITUAL ASSESSMENT:  Reviewed  Care plan unchanged	    GOALS OF CARE DISCUSSION:  Palliative care info/counseling provided	          See previous Palliative Medicine Note  Documentation of GOC: no escalation of care, no pressors, no new abx    AGENCY CHOICE DISCUSSED:     Other: LTACH          REFERRALS:	   Palliative Med   Unit SW/Case Mgmt    Nutrition  PT/OT    [ ]CRITICAL CARE TIME PROVIDED TO UNSTABLE PT W/ ORGAN FAILURE            [x]> 50% OF THE TIME SPENT IN COUNSELING AND COORDINATING CARE     [ ]PROLONGED SERVICE       FACE TO FACE: [x]PT     [ ]PT & FAMILY    Start:               End:  	       Minutes: Opt out

## 2024-12-03 NOTE — ED PROVIDER NOTE - OBJECTIVE STATEMENT
44-year-old male no significant past medical history presenting to the emerged from today after a fall with head strike.  Patient was sitting in his chair when he got caught on the comforter fell backwards and hit his head against the corner of the desk.  There was no loss of consciousness no nausea vomiting this happened 2 hours prior to arrival in the emergency department.  No visual changes patient with normal gait not on anticoagulants

## 2024-12-03 NOTE — ED ADULT NURSE NOTE - OBJECTIVE STATEMENT
Patient received A&Ox4, ambulatory with steady gait at the present. Patient complains of new onset of headstrike x2 hours PTA. Patient states his blanket got caught on the wheel of his desk chair, causing him to tip backwards. Patient hit head on dresser. Patient with area of swelling noted to back of head. No bleeding or openings notes. Patient denies vision changes, LOC or blood thinners. Side rails up, call light in reach, safety maintained, comfort measures provided and MD evaluation in progress.

## 2024-12-03 NOTE — ED PROVIDER NOTE - PHYSICAL EXAMINATION
Vitals: I have reviewed the patients vital signs  General: nontoxic appearing  HEENT: There is a contusion with small area of swelling over the left posterior occiput, normocephalic, airway patent  Eyes: EOMI, tracking appropriately pupils equal round reactive to light  Neck: no tracheal deviation  Chest/Lungs: no trauma, symmetric chest rise, speaking in complete sentences,  no resp distress  Heart: skin and extremities well perfused, regular rate and rhythm  Neuro: A+Ox3, appears non focal  MSK: no deformities  Skin: no cyanosis, no jaundice   Psych:  Normal mood and affect

## 2024-12-03 NOTE — ED PROVIDER NOTE - CLINICAL SUMMARY MEDICAL DECISION MAKING FREE TEXT BOX
Likely.44-year-old male coming in after a posterior head injury.  There is no loss of consciousness.  My suspicion for any significant or cranial injury is low.  With shared decision making the patient has opted for a CT scan.  If negative the patient can the given head injury precautions.  If positive will intervene 44-year-old male coming in after a posterior head injury.  There is no loss of consciousness.  My suspicion for any significant or cranial injury is low.  With shared decision making the patient has opted for a CT scan.  If negative the patient can the given head injury precautions.  If positive will intervene

## 2024-12-03 NOTE — ED ADULT TRIAGE NOTE - CHIEF COMPLAINT QUOTE
Patient fell backwards out of chair with headstrike on dresser at 2300. Endorsing HA. No vision changes, LOC or blood thinners.

## 2024-12-03 NOTE — ED PROVIDER NOTE - PATIENT PORTAL LINK FT
You can access the FollowMyHealth Patient Portal offered by [facility] by registering at the following website: [email or web address]. By joining [facility]’s FollowMyHealth portal, you will also be able to view your health information using other applications (apps) compatible with our system.

## 2025-02-10 ENCOUNTER — NON-APPOINTMENT (OUTPATIENT)
Age: 45
End: 2025-02-10

## 2025-02-24 ENCOUNTER — NON-APPOINTMENT (OUTPATIENT)
Age: 45
End: 2025-02-24

## 2025-02-25 PROBLEM — Z78.9 OTHER SPECIFIED HEALTH STATUS: Chronic | Status: ACTIVE | Noted: 2024-12-03

## 2025-04-15 ENCOUNTER — APPOINTMENT (OUTPATIENT)
Dept: CARDIOLOGY | Facility: CLINIC | Age: 45
End: 2025-04-15
Payer: MEDICARE

## 2025-04-15 ENCOUNTER — NON-APPOINTMENT (OUTPATIENT)
Age: 45
End: 2025-04-15

## 2025-04-15 VITALS — HEART RATE: 72 BPM | SYSTOLIC BLOOD PRESSURE: 120 MMHG | DIASTOLIC BLOOD PRESSURE: 80 MMHG

## 2025-04-15 VITALS — OXYGEN SATURATION: 99 % | HEART RATE: 66 BPM | WEIGHT: 175 LBS | HEIGHT: 71 IN | BODY MASS INDEX: 24.5 KG/M2

## 2025-04-15 DIAGNOSIS — I34.1 NONRHEUMATIC MITRAL (VALVE) PROLAPSE: ICD-10-CM

## 2025-04-15 PROCEDURE — 93000 ELECTROCARDIOGRAM COMPLETE: CPT

## 2025-04-15 PROCEDURE — 99204 OFFICE O/P NEW MOD 45 MIN: CPT | Mod: 25

## 2025-04-24 ENCOUNTER — APPOINTMENT (OUTPATIENT)
Dept: PULMONOLOGY | Facility: CLINIC | Age: 45
End: 2025-04-24
Payer: MEDICARE

## 2025-04-24 VITALS
HEIGHT: 71 IN | SYSTOLIC BLOOD PRESSURE: 146 MMHG | OXYGEN SATURATION: 96 % | HEART RATE: 55 BPM | DIASTOLIC BLOOD PRESSURE: 87 MMHG | TEMPERATURE: 98 F | BODY MASS INDEX: 23.52 KG/M2 | WEIGHT: 168 LBS

## 2025-04-24 DIAGNOSIS — R06.83 SNORING: ICD-10-CM

## 2025-04-24 PROCEDURE — 99204 OFFICE O/P NEW MOD 45 MIN: CPT

## 2025-04-24 PROCEDURE — G2211 COMPLEX E/M VISIT ADD ON: CPT

## 2025-05-06 ENCOUNTER — APPOINTMENT (OUTPATIENT)
Dept: CARDIOLOGY | Facility: CLINIC | Age: 45
End: 2025-05-06
Payer: MEDICARE

## 2025-05-06 PROCEDURE — 93306 TTE W/DOPPLER COMPLETE: CPT

## 2025-05-07 ENCOUNTER — OUTPATIENT (OUTPATIENT)
Dept: OUTPATIENT SERVICES | Facility: HOSPITAL | Age: 45
LOS: 1 days | End: 2025-05-07
Payer: MEDICARE

## 2025-05-07 DIAGNOSIS — G47.33 OBSTRUCTIVE SLEEP APNEA (ADULT) (PEDIATRIC): ICD-10-CM

## 2025-05-07 PROCEDURE — 95800 SLP STDY UNATTENDED: CPT

## 2025-05-07 PROCEDURE — 95800 SLP STDY UNATTENDED: CPT | Mod: 26

## 2025-05-13 ENCOUNTER — APPOINTMENT (OUTPATIENT)
Dept: DERMATOLOGY | Facility: CLINIC | Age: 45
End: 2025-05-13
Payer: MEDICARE

## 2025-05-13 DIAGNOSIS — D18.01 HEMANGIOMA OF SKIN AND SUBCUTANEOUS TISSUE: ICD-10-CM

## 2025-05-13 DIAGNOSIS — D48.5 NEOPLASM OF UNCERTAIN BEHAVIOR OF SKIN: ICD-10-CM

## 2025-05-13 DIAGNOSIS — L82.0 INFLAMED SEBORRHEIC KERATOSIS: ICD-10-CM

## 2025-05-13 DIAGNOSIS — L82.1 OTHER SEBORRHEIC KERATOSIS: ICD-10-CM

## 2025-05-13 DIAGNOSIS — D22.9 MELANOCYTIC NEVI, UNSPECIFIED: ICD-10-CM

## 2025-05-13 PROCEDURE — 17110 DESTRUCTION B9 LES UP TO 14: CPT

## 2025-05-13 PROCEDURE — 99203 OFFICE O/P NEW LOW 30 MIN: CPT | Mod: 25

## 2025-05-13 PROCEDURE — 11102 TANGNTL BX SKIN SINGLE LES: CPT | Mod: 59

## 2025-05-20 ENCOUNTER — NON-APPOINTMENT (OUTPATIENT)
Age: 45
End: 2025-05-20

## 2025-05-20 LAB — CORE LAB BIOPSY: NORMAL

## 2025-05-26 ENCOUNTER — NON-APPOINTMENT (OUTPATIENT)
Age: 45
End: 2025-05-26

## 2025-05-27 ENCOUNTER — APPOINTMENT (OUTPATIENT)
Dept: PULMONOLOGY | Facility: CLINIC | Age: 45
End: 2025-05-27
Payer: MEDICARE

## 2025-05-27 DIAGNOSIS — G47.21 CIRCADIAN RHYTHM SLEEP DISORDER, DELAYED SLEEP PHASE TYPE: ICD-10-CM

## 2025-05-27 DIAGNOSIS — F51.04 PSYCHOPHYSIOLOGIC INSOMNIA: ICD-10-CM

## 2025-05-27 PROCEDURE — 99213 OFFICE O/P EST LOW 20 MIN: CPT | Mod: 2W

## 2025-05-27 PROCEDURE — G2211 COMPLEX E/M VISIT ADD ON: CPT | Mod: 2W

## 2025-06-07 NOTE — ED ADULT NURSE NOTE - NS_ED_NURSE_TEACHING_TOPIC_ED_A_ED
Called to assess pt with low BP  Orders for vasopressin, epi, concentrated norepi, bicarb push and drip and hydrocortisone placed  RN verifying vancomycin dosing  Pt coded with ROSC with epi, CPR; ordered D50 also   On vent  Spoke to family and updated earlier on poor prognosis.   High K earlier- given more bicarb- repeat K lower  Fluid ordered to assist with resuscitation  ER MD assessed and felt no tamponade  Profound septic shock likely; LA very high over 12  VBG not picking up pH  Spoke with bedside RN and eICU RN and ER MD   Digestive

## 2025-07-07 ENCOUNTER — APPOINTMENT (OUTPATIENT)
Dept: GASTROENTEROLOGY | Facility: CLINIC | Age: 45
End: 2025-07-07

## 2025-07-07 ENCOUNTER — APPOINTMENT (OUTPATIENT)
Dept: GASTROENTEROLOGY | Facility: CLINIC | Age: 45
End: 2025-07-07
Payer: MEDICARE

## 2025-07-07 VITALS
BODY MASS INDEX: 24.36 KG/M2 | TEMPERATURE: 98.5 F | HEIGHT: 71 IN | RESPIRATION RATE: 17 BRPM | WEIGHT: 174 LBS | SYSTOLIC BLOOD PRESSURE: 113 MMHG | HEART RATE: 59 BPM | OXYGEN SATURATION: 96 % | DIASTOLIC BLOOD PRESSURE: 81 MMHG

## 2025-07-07 PROCEDURE — 99204 OFFICE O/P NEW MOD 45 MIN: CPT

## 2025-07-08 ENCOUNTER — APPOINTMENT (OUTPATIENT)
Dept: PULMONOLOGY | Facility: CLINIC | Age: 45
End: 2025-07-08
Payer: MEDICARE

## 2025-07-08 PROCEDURE — 99214 OFFICE O/P EST MOD 30 MIN: CPT | Mod: 2W

## 2025-07-08 PROCEDURE — G2211 COMPLEX E/M VISIT ADD ON: CPT | Mod: 2W

## 2025-07-08 RX ORDER — ESZOPICLONE 2 MG/1
2 TABLET, FILM COATED ORAL
Qty: 30 | Refills: 0 | Status: ACTIVE | COMMUNITY
Start: 2025-07-08 | End: 1900-01-01

## 2025-07-11 LAB
CALPROTECTIN FECAL: 156 UG/G
DEPRECATED O AND P PREP STL: NORMAL

## 2025-07-18 LAB
BACTERIA STL CULT: NORMAL
LACTOFERRIN STL-MCNC: 2.86 CD:794062635

## 2025-07-22 ENCOUNTER — APPOINTMENT (OUTPATIENT)
Dept: GASTROENTEROLOGY | Facility: CLINIC | Age: 45
End: 2025-07-22
Payer: MEDICARE

## 2025-07-22 VITALS
HEIGHT: 71 IN | SYSTOLIC BLOOD PRESSURE: 107 MMHG | HEART RATE: 57 BPM | TEMPERATURE: 98.5 F | WEIGHT: 175 LBS | RESPIRATION RATE: 17 BRPM | OXYGEN SATURATION: 96 % | DIASTOLIC BLOOD PRESSURE: 70 MMHG | BODY MASS INDEX: 24.5 KG/M2

## 2025-07-22 DIAGNOSIS — R19.5 OTHER FECAL ABNORMALITIES: ICD-10-CM

## 2025-07-22 DIAGNOSIS — R19.8 OTHER SPECIFIED SYMPTOMS AND SIGNS INVOLVING THE DIGESTIVE SYSTEM AND ABDOMEN: ICD-10-CM

## 2025-07-22 PROCEDURE — G2211 COMPLEX E/M VISIT ADD ON: CPT

## 2025-07-22 PROCEDURE — 99214 OFFICE O/P EST MOD 30 MIN: CPT

## 2025-08-05 ENCOUNTER — APPOINTMENT (OUTPATIENT)
Dept: PULMONOLOGY | Facility: CLINIC | Age: 45
End: 2025-08-05

## 2025-08-13 LAB
ANTI-A4 FLA2 IGG ELISA: 21.5 EU/ML
ANTI-CBIR1 ELISA: 7.9 EU/ML
ANTI-FLAX IGG ELISA: 24.4 EU/ML
ANTI-OMPC IGA ELISA: < 3.1 EU/ML
ASCA IGA ELISA: 10.2 EU/ML
ASCA IGG ELISA: 14.8 EU/ML
ATG16L1 SNP (RS2241880): NORMAL
CRP PROMTHEUS: 1.2 MG/L
ECM1 SNP (RS3737240): NORMAL
IBD AB 7 PNL SER: NORMAL
IBD-SPECIFIC PANCA IFA PATTERN DNASE SENSITIVITY: NOT DETECTED
IBD-SPECIFIC PANCA IFA PERINUCLEAR PATTERN: NOT DETECTED
ICAM-1 PROMETHEUS: 0.33 UG/ML
NKX2-3 SNP (RS10883365): NORMAL
PROMETHEUS LABORATORY FOOTER: NORMAL
SAA PROMETHEUS: 2.8 MG/L
STAT3 SNP (RS744166): NORMAL
VCAM-1 PROMETHEUS: 0.51 UG/ML
VEGF PROMETHEUS: 182 PG/ML

## 2025-08-14 ENCOUNTER — APPOINTMENT (OUTPATIENT)
Dept: ENDOCRINOLOGY | Facility: CLINIC | Age: 45
End: 2025-08-14
Payer: MEDICARE

## 2025-08-14 VITALS
TEMPERATURE: 97.6 F | WEIGHT: 165 LBS | SYSTOLIC BLOOD PRESSURE: 124 MMHG | RESPIRATION RATE: 17 BRPM | BODY MASS INDEX: 23.1 KG/M2 | DIASTOLIC BLOOD PRESSURE: 72 MMHG | HEIGHT: 71 IN | HEART RATE: 62 BPM | OXYGEN SATURATION: 97 %

## 2025-08-14 DIAGNOSIS — R79.89 OTHER SPECIFIED ABNORMAL FINDINGS OF BLOOD CHEMISTRY: ICD-10-CM

## 2025-08-14 DIAGNOSIS — R53.83 OTHER FATIGUE: ICD-10-CM

## 2025-08-14 PROCEDURE — 99204 OFFICE O/P NEW MOD 45 MIN: CPT

## 2025-08-21 LAB
TESTOST FREE SERPL-MCNC: 3.9 PG/ML
TESTOST SERPL-MCNC: 344 NG/DL

## 2025-09-04 ENCOUNTER — APPOINTMENT (OUTPATIENT)
Dept: ENDOCRINOLOGY | Facility: CLINIC | Age: 45
End: 2025-09-04

## 2025-09-04 ENCOUNTER — NON-APPOINTMENT (OUTPATIENT)
Age: 45
End: 2025-09-04

## 2025-09-15 ENCOUNTER — APPOINTMENT (OUTPATIENT)
Dept: GASTROENTEROLOGY | Facility: CLINIC | Age: 45
End: 2025-09-15